# Patient Record
Sex: FEMALE | URBAN - METROPOLITAN AREA
[De-identification: names, ages, dates, MRNs, and addresses within clinical notes are randomized per-mention and may not be internally consistent; named-entity substitution may affect disease eponyms.]

---

## 2023-05-18 ENCOUNTER — HOSPITAL ENCOUNTER (OUTPATIENT)
Facility: MEDICAL CENTER | Age: 73
End: 2023-05-20
Attending: STUDENT IN AN ORGANIZED HEALTH CARE EDUCATION/TRAINING PROGRAM | Admitting: STUDENT IN AN ORGANIZED HEALTH CARE EDUCATION/TRAINING PROGRAM

## 2023-05-18 DIAGNOSIS — N30.00 ACUTE CYSTITIS WITHOUT HEMATURIA: ICD-10-CM

## 2023-05-18 DIAGNOSIS — R79.89 ELEVATED LACTIC ACID LEVEL: ICD-10-CM

## 2023-05-18 DIAGNOSIS — R10.33 PERIUMBILICAL ABDOMINAL PAIN: ICD-10-CM

## 2023-05-18 DIAGNOSIS — K80.20 CALCULUS OF GALLBLADDER WITHOUT CHOLECYSTITIS WITHOUT OBSTRUCTION: ICD-10-CM

## 2023-05-18 DIAGNOSIS — R10.13 EPIGASTRIC PAIN: ICD-10-CM

## 2023-05-18 DIAGNOSIS — R11.2 NAUSEA AND VOMITING, UNSPECIFIED VOMITING TYPE: ICD-10-CM

## 2023-05-18 LAB — EKG IMPRESSION: NORMAL

## 2023-05-18 PROCEDURE — 93005 ELECTROCARDIOGRAM TRACING: CPT | Performed by: STUDENT IN AN ORGANIZED HEALTH CARE EDUCATION/TRAINING PROGRAM

## 2023-05-18 PROCEDURE — 93005 ELECTROCARDIOGRAM TRACING: CPT

## 2023-05-18 PROCEDURE — 99285 EMERGENCY DEPT VISIT HI MDM: CPT

## 2023-05-19 ENCOUNTER — APPOINTMENT (OUTPATIENT)
Dept: RADIOLOGY | Facility: MEDICAL CENTER | Age: 73
End: 2023-05-19
Attending: NURSE PRACTITIONER

## 2023-05-19 ENCOUNTER — APPOINTMENT (OUTPATIENT)
Dept: RADIOLOGY | Facility: MEDICAL CENTER | Age: 73
End: 2023-05-19
Attending: STUDENT IN AN ORGANIZED HEALTH CARE EDUCATION/TRAINING PROGRAM

## 2023-05-19 LAB
ALBUMIN SERPL BCP-MCNC: 3.6 G/DL (ref 3.2–4.9)
ALBUMIN/GLOB SERPL: 1.3 G/DL
ALP SERPL-CCNC: 69 U/L (ref 30–99)
ALT SERPL-CCNC: 14 U/L (ref 2–50)
ANION GAP SERPL CALC-SCNC: 14 MMOL/L (ref 7–16)
APPEARANCE UR: CLEAR
APPEARANCE UR: CLEAR
AST SERPL-CCNC: 15 U/L (ref 12–45)
BACTERIA #/AREA URNS HPF: ABNORMAL /HPF
BASOPHILS # BLD AUTO: 0.4 % (ref 0–1.8)
BASOPHILS # BLD AUTO: 0.5 % (ref 0–1.8)
BASOPHILS # BLD: 0.04 K/UL (ref 0–0.12)
BASOPHILS # BLD: 0.04 K/UL (ref 0–0.12)
BILIRUB SERPL-MCNC: 0.4 MG/DL (ref 0.1–1.5)
BILIRUB UR QL STRIP.AUTO: NEGATIVE
BILIRUB UR QL STRIP.AUTO: NEGATIVE
BUN SERPL-MCNC: 21 MG/DL (ref 8–22)
CALCIUM ALBUM COR SERPL-MCNC: 9.1 MG/DL (ref 8.5–10.5)
CALCIUM SERPL-MCNC: 8.8 MG/DL (ref 8.5–10.5)
CHLORIDE SERPL-SCNC: 99 MMOL/L (ref 96–112)
CO2 SERPL-SCNC: 20 MMOL/L (ref 20–33)
COLOR UR: YELLOW
COLOR UR: YELLOW
CREAT SERPL-MCNC: 0.68 MG/DL (ref 0.5–1.4)
EOSINOPHIL # BLD AUTO: 0.02 K/UL (ref 0–0.51)
EOSINOPHIL # BLD AUTO: 0.03 K/UL (ref 0–0.51)
EOSINOPHIL NFR BLD: 0.2 % (ref 0–6.9)
EOSINOPHIL NFR BLD: 0.3 % (ref 0–6.9)
EPI CELLS #/AREA URNS HPF: NEGATIVE /HPF
ERYTHROCYTE [DISTWIDTH] IN BLOOD BY AUTOMATED COUNT: 46.5 FL (ref 35.9–50)
ERYTHROCYTE [DISTWIDTH] IN BLOOD BY AUTOMATED COUNT: 49.5 FL (ref 35.9–50)
GFR SERPLBLD CREATININE-BSD FMLA CKD-EPI: 92 ML/MIN/1.73 M 2
GLOBULIN SER CALC-MCNC: 2.8 G/DL (ref 1.9–3.5)
GLUCOSE SERPL-MCNC: 116 MG/DL (ref 65–99)
GLUCOSE UR STRIP.AUTO-MCNC: NEGATIVE MG/DL
GLUCOSE UR STRIP.AUTO-MCNC: NEGATIVE MG/DL
HCT VFR BLD AUTO: 33.9 % (ref 37–47)
HCT VFR BLD AUTO: 35.5 % (ref 37–47)
HGB BLD-MCNC: 11.1 G/DL (ref 12–16)
HGB BLD-MCNC: 12 G/DL (ref 12–16)
HYALINE CASTS #/AREA URNS LPF: ABNORMAL /LPF
IMM GRANULOCYTES # BLD AUTO: 0.03 K/UL (ref 0–0.11)
IMM GRANULOCYTES # BLD AUTO: 0.05 K/UL (ref 0–0.11)
IMM GRANULOCYTES NFR BLD AUTO: 0.3 % (ref 0–0.9)
IMM GRANULOCYTES NFR BLD AUTO: 0.6 % (ref 0–0.9)
KETONES UR STRIP.AUTO-MCNC: NEGATIVE MG/DL
KETONES UR STRIP.AUTO-MCNC: NEGATIVE MG/DL
LACTATE SERPL-SCNC: 2.3 MMOL/L (ref 0.5–2)
LACTATE SERPL-SCNC: 2.4 MMOL/L (ref 0.5–2)
LACTATE SERPL-SCNC: 2.6 MMOL/L (ref 0.5–2)
LEUKOCYTE ESTERASE UR QL STRIP.AUTO: ABNORMAL
LEUKOCYTE ESTERASE UR QL STRIP.AUTO: NEGATIVE
LIPASE SERPL-CCNC: 37 U/L (ref 11–82)
LYMPHOCYTES # BLD AUTO: 1.28 K/UL (ref 1–4.8)
LYMPHOCYTES # BLD AUTO: 1.58 K/UL (ref 1–4.8)
LYMPHOCYTES NFR BLD: 13.8 % (ref 22–41)
LYMPHOCYTES NFR BLD: 17.9 % (ref 22–41)
MCH RBC QN AUTO: 30.9 PG (ref 27–33)
MCH RBC QN AUTO: 31.8 PG (ref 27–33)
MCHC RBC AUTO-ENTMCNC: 32.7 G/DL (ref 33.6–35)
MCHC RBC AUTO-ENTMCNC: 33.8 G/DL (ref 33.6–35)
MCV RBC AUTO: 91.5 FL (ref 81.4–97.8)
MCV RBC AUTO: 97.1 FL (ref 81.4–97.8)
MICRO URNS: ABNORMAL
MICRO URNS: NORMAL
MONOCYTES # BLD AUTO: 0.52 K/UL (ref 0–0.85)
MONOCYTES # BLD AUTO: 0.54 K/UL (ref 0–0.85)
MONOCYTES NFR BLD AUTO: 5.8 % (ref 0–13.4)
MONOCYTES NFR BLD AUTO: 5.9 % (ref 0–13.4)
NEUTROPHILS # BLD AUTO: 6.63 K/UL (ref 2–7.15)
NEUTROPHILS # BLD AUTO: 7.38 K/UL (ref 2–7.15)
NEUTROPHILS NFR BLD: 74.8 % (ref 44–72)
NEUTROPHILS NFR BLD: 79.5 % (ref 44–72)
NITRITE UR QL STRIP.AUTO: NEGATIVE
NITRITE UR QL STRIP.AUTO: POSITIVE
NRBC # BLD AUTO: 0 K/UL
NRBC # BLD AUTO: 0 K/UL
NRBC BLD-RTO: 0 /100 WBC
NRBC BLD-RTO: 0 /100 WBC
PH UR STRIP.AUTO: 6.5 [PH] (ref 5–8)
PH UR STRIP.AUTO: 7.5 [PH] (ref 5–8)
PLATELET # BLD AUTO: 204 K/UL (ref 164–446)
PLATELET # BLD AUTO: 229 K/UL (ref 164–446)
PMV BLD AUTO: 10 FL (ref 9–12.9)
PMV BLD AUTO: 9.8 FL (ref 9–12.9)
POTASSIUM SERPL-SCNC: 3.9 MMOL/L (ref 3.6–5.5)
PROCALCITONIN SERPL-MCNC: <0.05 NG/ML
PROT SERPL-MCNC: 6.4 G/DL (ref 6–8.2)
PROT UR QL STRIP: NEGATIVE MG/DL
PROT UR QL STRIP: NEGATIVE MG/DL
RBC # BLD AUTO: 3.49 M/UL (ref 4.2–5.4)
RBC # BLD AUTO: 3.88 M/UL (ref 4.2–5.4)
RBC # URNS HPF: ABNORMAL /HPF
RBC UR QL AUTO: NEGATIVE
RBC UR QL AUTO: NEGATIVE
SODIUM SERPL-SCNC: 133 MMOL/L (ref 135–145)
SP GR UR STRIP.AUTO: 1.01
SP GR UR STRIP.AUTO: 1.02
TROPONIN T SERPL-MCNC: 11 NG/L (ref 6–19)
UROBILINOGEN UR STRIP.AUTO-MCNC: 0.2 MG/DL
UROBILINOGEN UR STRIP.AUTO-MCNC: 0.2 MG/DL
WBC # BLD AUTO: 8.9 K/UL (ref 4.8–10.8)
WBC # BLD AUTO: 9.3 K/UL (ref 4.8–10.8)
WBC #/AREA URNS HPF: ABNORMAL /HPF

## 2023-05-19 PROCEDURE — A9270 NON-COVERED ITEM OR SERVICE: HCPCS | Performed by: STUDENT IN AN ORGANIZED HEALTH CARE EDUCATION/TRAINING PROGRAM

## 2023-05-19 PROCEDURE — 36415 COLL VENOUS BLD VENIPUNCTURE: CPT

## 2023-05-19 PROCEDURE — 80053 COMPREHEN METABOLIC PANEL: CPT

## 2023-05-19 PROCEDURE — 96374 THER/PROPH/DIAG INJ IV PUSH: CPT

## 2023-05-19 PROCEDURE — 96375 TX/PRO/DX INJ NEW DRUG ADDON: CPT

## 2023-05-19 PROCEDURE — 81003 URINALYSIS AUTO W/O SCOPE: CPT

## 2023-05-19 PROCEDURE — 700102 HCHG RX REV CODE 250 W/ 637 OVERRIDE(OP): Performed by: STUDENT IN AN ORGANIZED HEALTH CARE EDUCATION/TRAINING PROGRAM

## 2023-05-19 PROCEDURE — 81001 URINALYSIS AUTO W/SCOPE: CPT

## 2023-05-19 PROCEDURE — A9270 NON-COVERED ITEM OR SERVICE: HCPCS

## 2023-05-19 PROCEDURE — 700111 HCHG RX REV CODE 636 W/ 250 OVERRIDE (IP): Performed by: STUDENT IN AN ORGANIZED HEALTH CARE EDUCATION/TRAINING PROGRAM

## 2023-05-19 PROCEDURE — 700102 HCHG RX REV CODE 250 W/ 637 OVERRIDE(OP)

## 2023-05-19 PROCEDURE — 700117 HCHG RX CONTRAST REV CODE 255: Performed by: STUDENT IN AN ORGANIZED HEALTH CARE EDUCATION/TRAINING PROGRAM

## 2023-05-19 PROCEDURE — G0378 HOSPITAL OBSERVATION PER HR: HCPCS

## 2023-05-19 PROCEDURE — 96376 TX/PRO/DX INJ SAME DRUG ADON: CPT

## 2023-05-19 PROCEDURE — 96372 THER/PROPH/DIAG INJ SC/IM: CPT

## 2023-05-19 PROCEDURE — 83605 ASSAY OF LACTIC ACID: CPT | Mod: 91

## 2023-05-19 PROCEDURE — 700105 HCHG RX REV CODE 258

## 2023-05-19 PROCEDURE — 700105 HCHG RX REV CODE 258: Performed by: NURSE PRACTITIONER

## 2023-05-19 PROCEDURE — 83690 ASSAY OF LIPASE: CPT

## 2023-05-19 PROCEDURE — 84145 PROCALCITONIN (PCT): CPT

## 2023-05-19 PROCEDURE — 0241U HCHG SARS-COV-2 COVID-19 NFCT DS RESP RNA 4 TRGT MIC: CPT

## 2023-05-19 PROCEDURE — 74177 CT ABD & PELVIS W/CONTRAST: CPT

## 2023-05-19 PROCEDURE — 700105 HCHG RX REV CODE 258: Performed by: STUDENT IN AN ORGANIZED HEALTH CARE EDUCATION/TRAINING PROGRAM

## 2023-05-19 PROCEDURE — 85025 COMPLETE CBC W/AUTO DIFF WBC: CPT | Mod: 91

## 2023-05-19 PROCEDURE — 84484 ASSAY OF TROPONIN QUANT: CPT

## 2023-05-19 PROCEDURE — 71045 X-RAY EXAM CHEST 1 VIEW: CPT

## 2023-05-19 PROCEDURE — 99222 1ST HOSP IP/OBS MODERATE 55: CPT | Performed by: STUDENT IN AN ORGANIZED HEALTH CARE EDUCATION/TRAINING PROGRAM

## 2023-05-19 PROCEDURE — C9803 HOPD COVID-19 SPEC COLLECT: HCPCS

## 2023-05-19 RX ORDER — SODIUM CHLORIDE, SODIUM LACTATE, POTASSIUM CHLORIDE, CALCIUM CHLORIDE 600; 310; 30; 20 MG/100ML; MG/100ML; MG/100ML; MG/100ML
INJECTION, SOLUTION INTRAVENOUS CONTINUOUS
Status: DISCONTINUED | OUTPATIENT
Start: 2023-05-19 | End: 2023-05-20

## 2023-05-19 RX ORDER — ACETAMINOPHEN 325 MG/1
650 TABLET ORAL EVERY 6 HOURS PRN
Status: DISCONTINUED | OUTPATIENT
Start: 2023-05-24 | End: 2023-05-20 | Stop reason: HOSPADM

## 2023-05-19 RX ORDER — ONDANSETRON 2 MG/ML
4 INJECTION INTRAMUSCULAR; INTRAVENOUS ONCE
Status: COMPLETED | OUTPATIENT
Start: 2023-05-19 | End: 2023-05-19

## 2023-05-19 RX ORDER — LOSARTAN POTASSIUM 50 MG/1
50 TABLET ORAL DAILY
COMMUNITY

## 2023-05-19 RX ORDER — SODIUM CHLORIDE, SODIUM LACTATE, POTASSIUM CHLORIDE, AND CALCIUM CHLORIDE .6; .31; .03; .02 G/100ML; G/100ML; G/100ML; G/100ML
500 INJECTION, SOLUTION INTRAVENOUS ONCE
Status: COMPLETED | OUTPATIENT
Start: 2023-05-19 | End: 2023-05-19

## 2023-05-19 RX ORDER — OMEPRAZOLE 20 MG/1
40 CAPSULE, DELAYED RELEASE ORAL DAILY
Status: DISCONTINUED | OUTPATIENT
Start: 2023-05-19 | End: 2023-05-20 | Stop reason: HOSPADM

## 2023-05-19 RX ORDER — OXYCODONE HYDROCHLORIDE 5 MG/1
5 TABLET ORAL
Status: DISCONTINUED | OUTPATIENT
Start: 2023-05-19 | End: 2023-05-20 | Stop reason: HOSPADM

## 2023-05-19 RX ORDER — POLYETHYLENE GLYCOL 3350 17 G/17G
1 POWDER, FOR SOLUTION ORAL
Status: DISCONTINUED | OUTPATIENT
Start: 2023-05-19 | End: 2023-05-20 | Stop reason: HOSPADM

## 2023-05-19 RX ORDER — SODIUM CHLORIDE 9 MG/ML
1000 INJECTION, SOLUTION INTRAVENOUS ONCE
Status: COMPLETED | OUTPATIENT
Start: 2023-05-19 | End: 2023-05-19

## 2023-05-19 RX ORDER — ONDANSETRON 2 MG/ML
4 INJECTION INTRAMUSCULAR; INTRAVENOUS EVERY 4 HOURS PRN
Status: DISCONTINUED | OUTPATIENT
Start: 2023-05-19 | End: 2023-05-20 | Stop reason: HOSPADM

## 2023-05-19 RX ORDER — MORPHINE SULFATE 4 MG/ML
2 INJECTION INTRAVENOUS ONCE
Status: COMPLETED | OUTPATIENT
Start: 2023-05-19 | End: 2023-05-19

## 2023-05-19 RX ORDER — SUCRALFATE ORAL 1 G/10ML
1 SUSPENSION ORAL EVERY 6 HOURS
Status: DISCONTINUED | OUTPATIENT
Start: 2023-05-19 | End: 2023-05-19

## 2023-05-19 RX ORDER — OXYCODONE HYDROCHLORIDE 10 MG/1
10 TABLET ORAL
Status: DISCONTINUED | OUTPATIENT
Start: 2023-05-19 | End: 2023-05-20 | Stop reason: HOSPADM

## 2023-05-19 RX ORDER — ENOXAPARIN SODIUM 100 MG/ML
40 INJECTION SUBCUTANEOUS DAILY
Status: DISCONTINUED | OUTPATIENT
Start: 2023-05-19 | End: 2023-05-20 | Stop reason: HOSPADM

## 2023-05-19 RX ORDER — SUCRALFATE 1 G/1
1 TABLET ORAL EVERY 6 HOURS
Status: DISCONTINUED | OUTPATIENT
Start: 2023-05-19 | End: 2023-05-19

## 2023-05-19 RX ORDER — ACETAMINOPHEN 325 MG/1
650 TABLET ORAL EVERY 6 HOURS
Status: DISCONTINUED | OUTPATIENT
Start: 2023-05-19 | End: 2023-05-20 | Stop reason: HOSPADM

## 2023-05-19 RX ORDER — AMOXICILLIN 250 MG
2 CAPSULE ORAL 2 TIMES DAILY
Status: DISCONTINUED | OUTPATIENT
Start: 2023-05-19 | End: 2023-05-20 | Stop reason: HOSPADM

## 2023-05-19 RX ORDER — SODIUM CHLORIDE, SODIUM LACTATE, POTASSIUM CHLORIDE, CALCIUM CHLORIDE 600; 310; 30; 20 MG/100ML; MG/100ML; MG/100ML; MG/100ML
INJECTION, SOLUTION INTRAVENOUS CONTINUOUS
Status: ACTIVE | OUTPATIENT
Start: 2023-05-19 | End: 2023-05-19

## 2023-05-19 RX ORDER — BISACODYL 10 MG
10 SUPPOSITORY, RECTAL RECTAL
Status: DISCONTINUED | OUTPATIENT
Start: 2023-05-19 | End: 2023-05-20 | Stop reason: HOSPADM

## 2023-05-19 RX ORDER — HYDROMORPHONE HYDROCHLORIDE 1 MG/ML
0.5 INJECTION, SOLUTION INTRAMUSCULAR; INTRAVENOUS; SUBCUTANEOUS
Status: DISCONTINUED | OUTPATIENT
Start: 2023-05-19 | End: 2023-05-20 | Stop reason: HOSPADM

## 2023-05-19 RX ADMIN — MORPHINE SULFATE 2 MG: 4 INJECTION INTRAVENOUS at 01:56

## 2023-05-19 RX ADMIN — ACETAMINOPHEN 650 MG: 325 TABLET, FILM COATED ORAL at 23:16

## 2023-05-19 RX ADMIN — ENOXAPARIN SODIUM 40 MG: 100 INJECTION SUBCUTANEOUS at 17:51

## 2023-05-19 RX ADMIN — SODIUM CHLORIDE 1000 ML: 9 INJECTION, SOLUTION INTRAVENOUS at 06:20

## 2023-05-19 RX ADMIN — SODIUM CHLORIDE, POTASSIUM CHLORIDE, SODIUM LACTATE AND CALCIUM CHLORIDE 500 ML: 600; 310; 30; 20 INJECTION, SOLUTION INTRAVENOUS at 10:05

## 2023-05-19 RX ADMIN — OMEPRAZOLE 40 MG: 20 CAPSULE, DELAYED RELEASE ORAL at 06:18

## 2023-05-19 RX ADMIN — ACETAMINOPHEN 650 MG: 325 TABLET, FILM COATED ORAL at 11:28

## 2023-05-19 RX ADMIN — OXYCODONE HYDROCHLORIDE 10 MG: 10 TABLET ORAL at 05:34

## 2023-05-19 RX ADMIN — SUCRALFATE 1 G: 1 TABLET ORAL at 10:04

## 2023-05-19 RX ADMIN — ACETAMINOPHEN 650 MG: 325 TABLET, FILM COATED ORAL at 06:18

## 2023-05-19 RX ADMIN — SODIUM CHLORIDE 1000 ML: 9 INJECTION, SOLUTION INTRAVENOUS at 01:55

## 2023-05-19 RX ADMIN — LIDOCAINE HYDROCHLORIDE 15 ML: 20 SOLUTION OROPHARYNGEAL at 03:52

## 2023-05-19 RX ADMIN — IOHEXOL 100 ML: 350 INJECTION, SOLUTION INTRAVENOUS at 02:20

## 2023-05-19 RX ADMIN — SODIUM CHLORIDE, POTASSIUM CHLORIDE, SODIUM LACTATE AND CALCIUM CHLORIDE: 600; 310; 30; 20 INJECTION, SOLUTION INTRAVENOUS at 06:22

## 2023-05-19 RX ADMIN — SODIUM CHLORIDE, POTASSIUM CHLORIDE, SODIUM LACTATE AND CALCIUM CHLORIDE: 600; 310; 30; 20 INJECTION, SOLUTION INTRAVENOUS at 17:50

## 2023-05-19 RX ADMIN — LIDOCAINE HYDROCHLORIDE 30 ML: 20 SOLUTION OROPHARYNGEAL at 06:18

## 2023-05-19 RX ADMIN — ACETAMINOPHEN 650 MG: 325 TABLET, FILM COATED ORAL at 17:49

## 2023-05-19 RX ADMIN — SUCRALFATE 1 G: 1 TABLET ORAL at 12:32

## 2023-05-19 RX ADMIN — SENNOSIDES AND DOCUSATE SODIUM 2 TABLET: 50; 8.6 TABLET ORAL at 06:18

## 2023-05-19 RX ADMIN — MORPHINE SULFATE 2 MG: 4 INJECTION INTRAVENOUS at 03:51

## 2023-05-19 RX ADMIN — FAMOTIDINE 20 MG: 10 INJECTION INTRAVENOUS at 03:52

## 2023-05-19 RX ADMIN — SODIUM CHLORIDE, POTASSIUM CHLORIDE, SODIUM LACTATE AND CALCIUM CHLORIDE: 600; 310; 30; 20 INJECTION, SOLUTION INTRAVENOUS at 20:30

## 2023-05-19 RX ADMIN — SODIUM CHLORIDE, POTASSIUM CHLORIDE, SODIUM LACTATE AND CALCIUM CHLORIDE: 600; 310; 30; 20 INJECTION, SOLUTION INTRAVENOUS at 11:21

## 2023-05-19 RX ADMIN — SENNOSIDES AND DOCUSATE SODIUM 2 TABLET: 50; 8.6 TABLET ORAL at 17:49

## 2023-05-19 RX ADMIN — ONDANSETRON 4 MG: 2 INJECTION INTRAMUSCULAR; INTRAVENOUS at 01:55

## 2023-05-19 ASSESSMENT — ENCOUNTER SYMPTOMS
HEADACHES: 0
PALPITATIONS: 0
ABDOMINAL PAIN: 1
VOMITING: 1
SHORTNESS OF BREATH: 0
DOUBLE VISION: 0
SINUS PAIN: 0
BLOOD IN STOOL: 0
WEAKNESS: 0
SORE THROAT: 0
BLURRED VISION: 0
NAUSEA: 1
COUGH: 0
CHILLS: 0
SPUTUM PRODUCTION: 0
NERVOUS/ANXIOUS: 0
DIZZINESS: 0
BACK PAIN: 0
FEVER: 0
MYALGIAS: 0

## 2023-05-19 ASSESSMENT — LIFESTYLE VARIABLES
HAVE YOU EVER FELT YOU SHOULD CUT DOWN ON YOUR DRINKING: NO
AVERAGE NUMBER OF DAYS PER WEEK YOU HAVE A DRINK CONTAINING ALCOHOL: 0
TOTAL SCORE: 0
ALCOHOL_USE: NO
HAVE PEOPLE ANNOYED YOU BY CRITICIZING YOUR DRINKING: NO
ON A TYPICAL DAY WHEN YOU DRINK ALCOHOL HOW MANY DRINKS DO YOU HAVE: 0
HOW MANY TIMES IN THE PAST YEAR HAVE YOU HAD 5 OR MORE DRINKS IN A DAY: 0
TOTAL SCORE: 0
DOES PATIENT WANT TO STOP DRINKING: CANNOT ASSESS
EVER HAD A DRINK FIRST THING IN THE MORNING TO STEADY YOUR NERVES TO GET RID OF A HANGOVER: NO
EVER FELT BAD OR GUILTY ABOUT YOUR DRINKING: NO
TOTAL SCORE: 0
CONSUMPTION TOTAL: NEGATIVE

## 2023-05-19 ASSESSMENT — PATIENT HEALTH QUESTIONNAIRE - PHQ9
SUM OF ALL RESPONSES TO PHQ9 QUESTIONS 1 AND 2: 0
2. FEELING DOWN, DEPRESSED, IRRITABLE, OR HOPELESS: NOT AT ALL
1. LITTLE INTEREST OR PLEASURE IN DOING THINGS: NOT AT ALL

## 2023-05-19 ASSESSMENT — FIBROSIS 4 INDEX: FIB4 SCORE: 1.26

## 2023-05-19 ASSESSMENT — PAIN DESCRIPTION - PAIN TYPE
TYPE: ACUTE PAIN

## 2023-05-19 NOTE — ASSESSMENT & PLAN NOTE
Noted on CT  She describes episodes of pain suspicious for biliary colic in past, however she states this episode is entirely different.   No dilation or secondary signs of inflammation reported on CT.

## 2023-05-19 NOTE — HOSPITAL COURSE
"Professional  services were used in the patient's primary language of Bengali.    Umu Harris is a 72 y.o. female with history of cholelithiasis and GERD on no home medications who presented to ED on 5/18 with abdominal pain.  After eating spicy food for dinner she developed a midline to LUQ pain around 6pm in the evening. She describes the pain as constant with intermittent increases, burning, and cramping. She has not eaten since the pain began and noted no exacerbating or alleviating factors. She did initially have some associated nausea and vomiting. Having no change in BM pattern. No fever, chills, sick contacts, or recent travel. No dysuria. No prior abdominal surgeries. She states she has had \"gallbladder pain\" in the past which was quite unlike her pain now.     In ED, she was afebrile, HR 75, /101, on room air. Labs on presentation notable for WBC 8.0 and PMN 74.8%, Na 133, glucose 116, and lactic acid 2.6. AST, ALT, ALP, Tbili, and lipase all WNL. Troponin 11. EKG sinus without ischemic changes. UA unremarkable. CT of A/P with contrast reported cholelithiasis, hepatomegaly, small fat containing umbilical hernia, and coronary artery disease, otherwise no acute findings - no secondary signs of acute cholecystitis. No focal RUQ tenderness on exam. She was treated with GI cocktail and had intermittent relief, but pain did eventually return. IVF were given and lactic acid did trend down but remained persistently elevated. Patient was admitted to hospitalist service for observation, continued fluids, and serial lactates.     She was hydrated with IVF overnight. Due to symptoms of dysuria, her UA was repeated and revealed a urinary infection. Ceftriaxone was started after which her hyperlactatemia resolved. She remained afebrile. By 5/20 AM she was able to tolerate a diet without nausea / pain. She feels ready to go home at this time. Prescriptions were provided for 3 days Bactrim DS " BID as well as continuation of oral omeprazole at home. Provided instructions to follow with her PCP and sent referral for a [facility] PCP in case she wishes to establish with one.

## 2023-05-19 NOTE — ED PROVIDER NOTES
ED Provider Note    CHIEF COMPLAINT  Chief Complaint   Patient presents with    Abdominal Pain     Patient reports to generalized abdominal pain that started at 1800 this evening. Patient reports the pain is located more severely above her umbilicus but is generalized through out.     N/V     Started when the pain began at 1800 this evening.        EXTERNAL RECORDS REVIEWED  None    HPI/ROS  LIMITATION TO HISTORY   Select: Language Faroese,  Used   OUTSIDE HISTORIAN(S):  Significant other  at bedside    Umu Harris is a 72 y.o. female who presents with abdominal pain.  She says it started at 6 PM this evening.  She describes it as mostly periumbilical which radiates around her right side into her back.  She says it is 'coming and going' in severity and she describes it as a cramping sensation.  She says it started after eating some hot [employer].  She has associated nausea and vomiting.  She did have a normal bowel movement today without any diarrhea.  She denies any fevers or chills.  She denies any dysuria, hematuria, urgency or flank pain.  She denies any prior abdominal surgical history.    PAST MEDICAL HISTORY   has a past medical history of Hypertension.    SURGICAL HISTORY  patient denies any surgical history    FAMILY HISTORY  History reviewed. No pertinent family history.    SOCIAL HISTORY  Social History     Tobacco Use    Smoking status: Never    Smokeless tobacco: Never   Substance and Sexual Activity    Alcohol use: Not Currently    Drug use: Never    Sexual activity: Not on file       CURRENT MEDICATIONS  Home Medications       Reviewed by Osman Cueto R.N. (Registered Nurse) on 05/18/23 at 2336  Med List Status: Partial     Medication Last Dose Status        Patient Manjit Taking any Medications                           ALLERGIES  No Known Allergies    PHYSICAL EXAM  VITAL SIGNS: BP (!) 178/81   Pulse 63   Temp 36.2 °C (97.2 °F) (Temporal)   Resp 17   Ht 1.575 m  "(5' 2\")   Wt 72.6 kg (160 lb)   SpO2 94%   BMI 29.26 kg/m²    Constitutional: Awake and alert . Non toxic  HENT: Normal inspection. Dry mucous membranes  Eyes: Normal inspection  Neck: Grossly normal range of motion.  Cardiovascular: Normal heart rate, Normal rhythm.  Symmetric peripheral pulses.   Thorax & Lungs: No respiratory distress, No wheezing, No rales, No rhonchi, No chest tenderness.   Abdomen: Soft, non-distended, she has periumbilical pain without tenderness to palpation in the right lower quadrant, left lower quadrant or right upper quadrant.  Negative Rodney sign., no mass  Skin: No obvious rash.  Extremities: Warm, well perfused. No clubbing, cyanosis, edema   Neurologic: Grossly normal   Psychiatric: Normal for situation      DIAGNOSTIC STUDIES / PROCEDURES  EKG  I have independently interpreted this EKG  Normal rate, normal rhythm.  Intervals within normal limits.  Poor R wave progression.  No ST wave elevations or depressions.    LABS  Results for orders placed or performed during the hospital encounter of 05/18/23   CBC with Differential   Result Value Ref Range    WBC 8.9 4.8 - 10.8 K/uL    RBC 3.88 (L) 4.20 - 5.40 M/uL    Hemoglobin 12.0 12.0 - 16.0 g/dL    Hematocrit 35.5 (L) 37.0 - 47.0 %    MCV 91.5 81.4 - 97.8 fL    MCH 30.9 27.0 - 33.0 pg    MCHC 33.8 33.6 - 35.0 g/dL    RDW 46.5 35.9 - 50.0 fL    Platelet Count 229 164 - 446 K/uL    MPV 10.0 9.0 - 12.9 fL    Neutrophils-Polys 74.80 (H) 44.00 - 72.00 %    Lymphocytes 17.90 (L) 22.00 - 41.00 %    Monocytes 5.90 0.00 - 13.40 %    Eosinophils 0.30 0.00 - 6.90 %    Basophils 0.50 0.00 - 1.80 %    Immature Granulocytes 0.60 0.00 - 0.90 %    Nucleated RBC 0.00 /100 WBC    Neutrophils (Absolute) 6.63 2.00 - 7.15 K/uL    Lymphs (Absolute) 1.58 1.00 - 4.80 K/uL    Monos (Absolute) 0.52 0.00 - 0.85 K/uL    Eos (Absolute) 0.03 0.00 - 0.51 K/uL    Baso (Absolute) 0.04 0.00 - 0.12 K/uL    Immature Granulocytes (abs) 0.05 0.00 - 0.11 K/uL    NRBC " (Absolute) 0.00 K/uL   Complete Metabolic Panel   Result Value Ref Range    Sodium 133 (L) 135 - 145 mmol/L    Potassium 3.9 3.6 - 5.5 mmol/L    Chloride 99 96 - 112 mmol/L    Co2 20 20 - 33 mmol/L    Anion Gap 14.0 7.0 - 16.0    Glucose 116 (H) 65 - 99 mg/dL    Bun 21 8 - 22 mg/dL    Creatinine 0.68 0.50 - 1.40 mg/dL    Calcium 8.8 8.5 - 10.5 mg/dL    AST(SGOT) 15 12 - 45 U/L    ALT(SGPT) 14 2 - 50 U/L    Alkaline Phosphatase 69 30 - 99 U/L    Total Bilirubin 0.4 0.1 - 1.5 mg/dL    Albumin 3.6 3.2 - 4.9 g/dL    Total Protein 6.4 6.0 - 8.2 g/dL    Globulin 2.8 1.9 - 3.5 g/dL    A-G Ratio 1.3 g/dL   Lipase   Result Value Ref Range    Lipase 37 11 - 82 U/L   Urinalysis    Specimen: Urine   Result Value Ref Range    Color Yellow     Character Clear     Specific Gravity 1.010 <1.035    Ph 7.5 5.0 - 8.0    Glucose Negative Negative mg/dL    Ketones Negative Negative mg/dL    Protein Negative Negative mg/dL    Bilirubin Negative Negative    Urobilinogen, Urine 0.2 Negative    Nitrite Negative Negative    Leukocyte Esterase Negative Negative    Occult Blood Negative Negative    Micro Urine Req see below    LACTIC ACID   Result Value Ref Range    Lactic Acid 2.6 (H) 0.5 - 2.0 mmol/L   TROPONIN   Result Value Ref Range    Troponin T 11 6 - 19 ng/L   ESTIMATED GFR   Result Value Ref Range    GFR (CKD-EPI) 92 >60 mL/min/1.73 m 2   CORRECTED CALCIUM   Result Value Ref Range    Correct Calcium 9.1 8.5 - 10.5 mg/dL   LACTIC ACID   Result Value Ref Range    Lactic Acid 2.3 (H) 0.5 - 2.0 mmol/L   EKG (NOW)   Result Value Ref Range    Report       [facility] Emergency Dept.    Test Date:  2023  Pt Name:    FIDENCIO LIRIANO      Department: ER  MRN:        [id number]                      Room:  Gender:     Female                       Technician: [id number]  :        [id number]                   Requested By:ER TRIAGE PROTOCOL  Order #:    [id number]                    Reading MD:    Measurements  Intervals                                 Axis  Rate:       67                           P:          7  UT:         149                          QRS:        -39  QRSD:       94                           T:          25  QT:         433  QTc:        457    Interpretive Statements  Sinus rhythm  Left axis deviation  Abnormal R-wave progression, early transition  No previous ECG available for comparison           RADIOLOGY  I have independently interpreted the diagnostic imaging associated with this visit and am waiting the final reading from the radiologist.   My preliminary interpretation is as follows: No obvious bowel obstruction  Radiologist interpretation:   CT-ABDOMEN-PELVIS WITH   Final Result         1.  Cholelithiasis   2.  Hepatomegaly   3.  Small fat-containing umbilical hernia   4.  Atherosclerosis and atherosclerotic coronary artery disease            COURSE & MEDICAL DECISION MAKING    ED Observation Status? Yes; I am placing the patient in to an observation status due to a diagnostic uncertainty as well as therapeutic intensity. Patient placed in observation status at 12:32 AM, 5/19/2023.     Observation plan is as follows: IV, Labs, CT, Serial Exams    Upon Reevaluation, the patient's condition has: not improved; and will be escalated to hospitalization.    Patient discharged from ED Observation status at 5.00 AM 5/19    INITIAL ASSESSMENT, COURSE AND PLAN  Care Narrative: Patient is a 72 y.o. female who presents to the Emergency Department with periumbilical abdominal pain.     Differential diagnoses include, but not limited to: gastroenteritis; appendicitis; infectious colitis; small or large bowel obstruction; diverticulitis; GERD; gastritis; PUD; mass; pancreatitis; cystitis; kidney pathology (i.e. nephrolithiasis, pyelonephritis); biliary pathology (i.e. cholecystitis, cholangitis); ovarian torsion; ovarian cysts; ACS; mesenteric ischemia.     Patient arrived stable with normal vital signs.  Overall well  appearing, but in mild discomfort. ECG notable for no signs of ischemic. Labs notable for negative troponin, normal lipase, lactate elevated at 2.6.  Urinalysis without evidence of infection. ACS unlikely given reassuring ECG, negative troponin, and Heart Score of 3.  Mesenteric ischemia unlikely given reassuring exam, lactate elevated but is down trending after fluids. Ovarian pathology unlikely without complaints of pelvic pain .  CTAP notable for cholelithiasis, hepatomegaly and a fat-containing hernia but otherwise no acute findings..  CT without secondary signs of acute cholecystitis and I do think that this is unlikely given no focal right upper quadrant tenderness, no leukocytosis and normal liver function testing.    Patient given IVF, Zofran, and morphine    Patient was reevaluated and again had a worsening of her symptoms.  She continues to have a benign exam.  I treated her with a GI cocktail additional dose of morphine and famotidine.      Patient was reevaluated again, she is still endorsing pain.  She does intermittently get some relief however then comes back again.  Her lactate is down trending but remains persistently elevated and it is possible she remains dehydrated.  I again doubt ischemic process and her exam remains benign.  However given her persistent symptoms think it is safest to admit her to the hospital for observation, continued IV fluids and serial lactate      HYDRATION: Based on the patient's presentation of Acute Vomiting the patient was given IV fluids. IV Hydration was used because oral hydration was not adequate alone. Upon recheck following hydration, the patient was improved.        DISPOSITION AND DISCUSSIONS  I have discussed management of the patient with the following physicians and MICAELA's:  Dr. Vargas    Discussion of management with other [facility] or appropriate source(s): None       FINAL DIAGNOSIS  1. Nausea and vomiting, unspecified vomiting type Acute   2. Periumbilical  abdominal pain Acute   3. Elevated lactic acid level Acute          Electronically signed by: Ana Mendez M.D., 5/19/2023 12:29 AM

## 2023-05-19 NOTE — PROGRESS NOTES
"Hospital Medicine Daily Progress Note    Date of Service  5/19/2023    Chief Complaint  Umu Harris is a 72 y.o. female admitted 5/18/2023 with abdominal pain    Hospital Course  Professional  services were used in the patient's primary language of Armenian.    Umu Harris is a 72 y.o. female with history of cholelithiasis and GERD on no home medications who presented to ED on 5/18 with abdominal pain.  After eating spicy food for dinner she developed a midline to LUQ pain around 6pm in the evening. She describes the pain as constant with intermittent increases, burning, and cramping. She has not eaten since the pain began and noted no exacerbating or alleviating factors. She did initially have some associated nausea and vomiting. Having no change in BM pattern. No fever, chills, sick contacts, or recent travel. No dysuria. No prior abdominal surgeries. She states she has had \"gallbladder pain\" in the past which was quite unlike her pain now.     In ED, she was afebrile, HR 75, /101, on room air. Labs on presentation notable for WBC 8.0 and PMN 74.8%, Na 133, glucose 116, and lactic acid 2.6. AST, ALT, ALP, Tbili, and lipase all WNL. Troponin 11. EKG sinus without ischemic changes. UA unremarkable. CT of A/P with contrast reported cholelithiasis, hepatomegaly, small fat containing umbilical hernia, and coronary artery disease, otherwise no acute findings - no secondary signs of acute cholecystitis. No focal RUQ tenderness on exam. She was treated with GI cocktail and had intermittent relief, but pain did eventually return. IVF were given and lactic acid did trend down but remained persistently elevated. Patient was admitted to hospitalist service for observation, continued fluids, and serial lactates.     Interval Problem Update  5/19/2023: Patient states she is feeling better and pain has resolved, however nursing notes she has not tolerated more than clear liquids without " nausea. Her lactic acid remains unchanged at 2.4, and has a slight increase in WBC and PMNs. Afebrile, HR 60s, normotensive, room air.  No overt process on CXR or CT abdomen pelvis, cholelithiasis noted but biliary tree reported as non-dilated. No abdominal tenderness to palpation on exam, negative Rodney sign. Procal <0.05.   - H. Pylori pending. This is a send-out and result may take significant amount of time.   - Continue ppi.  - Continue IV fluids and trend lactate.     I have discussed this patient's plan of care and discharge plan at IDT rounds today with Case Management, Nursing, Nursing leadership, and other members of the IDT team.    Consultants/Specialty  none    Code Status  Full Code    Disposition  The patient is not medically cleared for discharge to home or a post-acute facility.  Anticipate discharge to: home with close outpatient follow-up    I have placed the appropriate orders for post-discharge needs.    Review of Systems  Review of Systems   Constitutional:  Negative for chills, fever and malaise/fatigue.   HENT:  Negative for congestion, sinus pain and sore throat.    Respiratory:  Negative for cough, sputum production and shortness of breath.    Cardiovascular:  Negative for chest pain and palpitations.   Gastrointestinal:  Positive for abdominal pain and nausea.   Genitourinary:  Negative for dysuria and frequency.   Musculoskeletal:  Negative for back pain and myalgias.   Neurological:  Negative for weakness and headaches.        Physical Exam  Temp:  [36.2 °C (97.2 °F)-37.2 °C (99 °F)] 37.2 °C (99 °F)  Pulse:  [63-75] 66  Resp:  [16-20] 16  BP: (121-212)/() 151/70  SpO2:  [90 %-97 %] 90 %    Physical Exam  Vitals and nursing note reviewed.   Constitutional:       General: She is not in acute distress.     Appearance: Normal appearance. She is obese. She is not ill-appearing.   HENT:      Head: Normocephalic and atraumatic.      Mouth/Throat:      Mouth: Mucous membranes are dry.       Pharynx: Oropharynx is clear. No oropharyngeal exudate or posterior oropharyngeal erythema.   Eyes:      Pupils: Pupils are equal, round, and reactive to light.   Cardiovascular:      Rate and Rhythm: Normal rate and regular rhythm.      Pulses: Normal pulses.      Heart sounds: Normal heart sounds.   Pulmonary:      Effort: Pulmonary effort is normal. No respiratory distress.      Breath sounds: Normal breath sounds. No wheezing or rales.   Abdominal:      General: Bowel sounds are normal. There is no distension.      Palpations: Abdomen is soft. There is no mass.      Tenderness: There is no abdominal tenderness. There is no right CVA tenderness, left CVA tenderness, guarding or rebound.      Comments: Non-tender to palpation, negative Rodney's sign.   Musculoskeletal:         General: No swelling. Normal range of motion.      Cervical back: Normal range of motion and neck supple.      Right lower leg: No edema.      Left lower leg: No edema.   Skin:     General: Skin is warm and dry.      Capillary Refill: Capillary refill takes less than 2 seconds.      Findings: No erythema.   Neurological:      General: No focal deficit present.      Mental Status: She is alert and oriented to person, place, and time. Mental status is at baseline.   Psychiatric:         Mood and Affect: Mood normal.         Behavior: Behavior normal.         Thought Content: Thought content normal.         Judgment: Judgment normal.         Fluids    Intake/Output Summary (Last 24 hours) at 5/19/2023 1720  Last data filed at 5/19/2023 0622  Gross per 24 hour   Intake 1060 ml   Output --   Net 1060 ml       Laboratory  Recent Labs     05/19/23  0125 05/19/23  1039   WBC 8.9 9.3   RBC 3.88* 3.49*   HEMOGLOBIN 12.0 11.1*   HEMATOCRIT 35.5* 33.9*   MCV 91.5 97.1   MCH 30.9 31.8   MCHC 33.8 32.7*   RDW 46.5 49.5   PLATELETCT 229 204   MPV 10.0 9.8     Recent Labs     05/19/23  0125   SODIUM 133*   POTASSIUM 3.9   CHLORIDE 99   CO2 20   GLUCOSE  116*   BUN 21   CREATININE 0.68   CALCIUM 8.8     Imaging  DX-CHEST-PORTABLE (1 VIEW)   Final Result      1.  Cardiomegaly. No overt failure or pneumonia.      CT-ABDOMEN-PELVIS WITH   Final Result         1.  Cholelithiasis   2.  Hepatomegaly   3.  Small fat-containing umbilical hernia   4.  Atherosclerosis and atherosclerotic coronary artery disease           Assessment/Plan  * Abdominal pain- (present on admission)  Assessment & Plan  Epigastric to left upper quadrant abdominal pain for approximately 1 day. No clear relation to food.   Pain has improved overnight and tolerating clear liquid diet, however did get nauseous with solids.   Did improve somewhat with GI cocktail and PPI.   Peptic ulcer disease (h.pylori?) vs gastritis vs other  CT abdomen pelvis with contrast revealed no acute pathologies.  She does have cholelithiasis on above CT but this pain is not typical to her biliary colic symptoms in the past.   -Continue PPI, GI cocktail  -H. pylori stool antigen pending  -Pain control, antiemetics    Cholelithiasis  Assessment & Plan  Noted on CT  She describes episodes of pain suspicious for biliary colic in past, however she states this episode is entirely different.   No dilation or secondary signs of inflammation reported on CT.     Hyperlactatemia  Assessment & Plan  Mild persistent lactic acid elevation, around 2.4  Etiology unclear. WBC and PMN juan slightly. Procalcitonin <0.05. CXR clear. CT abdomen pelvis with contrast reported no acute process. Afebrile. Her only symptom currently is nausea with solid food intake. She did appear dehydrated on initial presentation, however she has had fluid resuscitation overnight with no change in lactate.   - Continue IV fluid resuscitation  - Trend lactate         VTE prophylaxis: enoxaparin ppx    I have performed a physical exam and reviewed and updated ROS and Plan today (5/19/2023). In review of yesterday's note (5/18/2023), there are no changes except as  documented above.

## 2023-05-19 NOTE — CARE PLAN
The patient is Stable - Low risk of patient condition declining or worsening    Shift Goals  Clinical Goals: IVF, pain/N/V control  Patient Goals: pain relief  Family Goals: HARSH    Progress made toward(s) clinical / shift goals:      Problem: Pain - Standard  Goal: Alleviation of pain or a reduction in pain to the patient’s comfort goal  Outcome: Progressing     Problem: Knowledge Deficit - Standard  Goal: Patient and family/care givers will demonstrate understanding of plan of care, disease process/condition, diagnostic tests and medications  Outcome: Progressing       Patient is not progressing towards the following goals:

## 2023-05-19 NOTE — H&P
Hospital Medicine History & Physical Note    Date of Service  5/19/2023    Primary Care Physician  No primary care provider on file.      Code Status  Full Code    Chief Complaint  Chief Complaint   Patient presents with    Abdominal Pain     Patient reports to generalized abdominal pain that started at 1800 this evening. Patient reports the pain is located more severely above her umbilicus but is generalized through out.     N/V     Started when the pain began at 1800 this evening.        History of Presenting Illness  Umu Harris is a 72 y.o. female who presented 5/18/2023 with abdominal pain.     services were used in the patient's primary language of Cymraes.     Name or Number: [id number] Blaire  Mode of interpretation: iPad    Is a pleasant woman with history of cholelithiasis, currently not on any other medications or aware of other diagnosed medical problems.  She presents due to abdominal pain onset yesterday around 6 PM.  Pain is epigastric to left upper quadrant, described as constant, burning/sharp with intermittent increases in severity, associated with nausea and vomiting.  She has not noticed any relation with food as she has not tried to eat since the onset.  In the past she has had what sounds like she is describing biliary colic, with right upper quadrant pain radiating to her back and was told she had stones that needed to be removed surgically.  She reports that the pain she is experiencing now is distinctly different from that both in quality and and location.  She denies taking any NSAIDs.  No recent travel though she is from [place] originally.  She was requiring multiple doses of IV pain medications in the ED so observation admission was requested.      I discussed the plan of care with patient and family.    Review of Systems  Review of Systems   Constitutional:  Negative for chills and fever.   HENT:  Negative for sinus pain and sore throat.    Eyes:   Negative for blurred vision and double vision.   Respiratory:  Negative for cough and shortness of breath.    Cardiovascular:  Negative for chest pain and leg swelling.   Gastrointestinal:  Positive for abdominal pain, nausea and vomiting. Negative for blood in stool and melena.   Genitourinary:  Negative for dysuria and urgency.   Musculoskeletal:  Negative for joint pain and myalgias.   Neurological:  Negative for dizziness and headaches.   Psychiatric/Behavioral:  The patient is not nervous/anxious.        Past Medical History   has a past medical history of Hypertension.    Surgical History   has no past surgical history on file.     Family History  Patient patient unsure if history of gastric ulcers  Family history reviewed with patient. There is no family history that is pertinent to the chief complaint.     Social History   reports that she has never smoked. She has never used smokeless tobacco. She reports that she does not currently use alcohol. She reports that she does not use drugs.    Allergies  No Known Allergies    Medications  None       Physical Exam  Temp:  [36.2 °C (97.2 °F)-36.6 °C (97.8 °F)] 36.6 °C (97.8 °F)  Pulse:  [63-75] 64  Resp:  [17-20] 20  BP: (121-212)/() 141/63  SpO2:  [93 %-97 %] 94 %  Blood Pressure : (!) 141/63   Temperature: 36.6 °C (97.8 °F)   Pulse: 64   Respiration: 20   Pulse Oximetry: 94 %       Physical Exam  Constitutional:       Appearance: She is obese. She is not diaphoretic.      Comments: Appears uncomfortable   HENT:      Head: Normocephalic and atraumatic.      Nose: Nose normal.      Mouth/Throat:      Mouth: Mucous membranes are dry.      Pharynx: Oropharynx is clear.   Eyes:      Extraocular Movements: Extraocular movements intact.      Conjunctiva/sclera: Conjunctivae normal.   Cardiovascular:      Rate and Rhythm: Normal rate and regular rhythm.      Pulses: Normal pulses.      Heart sounds: Normal heart sounds.   Pulmonary:      Effort: Pulmonary effort  is normal.      Breath sounds: Normal breath sounds.   Abdominal:      Comments: Mild epigastric tenderness, no right upper quadrant tenderness and negative Rodney sign   Musculoskeletal:         General: No swelling or tenderness. Normal range of motion.      Cervical back: Normal range of motion and neck supple.   Skin:     General: Skin is warm and dry.      Capillary Refill: Capillary refill takes less than 2 seconds.   Neurological:      General: No focal deficit present.      Mental Status: She is oriented to person, place, and time.         Laboratory:  Recent Labs     05/19/23  0125   WBC 8.9   RBC 3.88*   HEMOGLOBIN 12.0   HEMATOCRIT 35.5*   MCV 91.5   MCH 30.9   MCHC 33.8   RDW 46.5   PLATELETCT 229   MPV 10.0     Recent Labs     05/19/23  0125   SODIUM 133*   POTASSIUM 3.9   CHLORIDE 99   CO2 20   GLUCOSE 116*   BUN 21   CREATININE 0.68   CALCIUM 8.8     Recent Labs     05/19/23  0125   ALTSGPT 14   ASTSGOT 15   ALKPHOSPHAT 69   TBILIRUBIN 0.4   LIPASE 37   GLUCOSE 116*         No results for input(s): NTPROBNP in the last 72 hours.      Recent Labs     05/19/23  0125   TROPONINT 11       Imaging:  CT-ABDOMEN-PELVIS WITH   Final Result         1.  Cholelithiasis   2.  Hepatomegaly   3.  Small fat-containing umbilical hernia   4.  Atherosclerosis and atherosclerotic coronary artery disease          EKG:  I have personally reviewed the images and compared with prior images. and My impression is: Normal sinus rhythm with ventricular rate 67, left axis deviation, no ST elevations or depressions, QTc 457    Assessment/Plan:  Justification for Admission Status  I anticipate this patient is appropriate for observation status at this time because requiring multiple doses of IV pain medications with monitoring for toxicity, trial of PPI and GI cocktail and assess efficacy    Patient will need a Med/Surg bed on MEDICAL service .  The need is secondary to above.    * Abdominal pain- (present on  admission)  Assessment & Plan  Epigastric to left upper quadrant abdominal pain for the past 12+ hours.  Constant however intermittently worsens.  No clear relation to food.  Suspicious for peptic ulcer disease, possibly H. pylori given patient is from endemic area, possible gastritis.  No evidence of pancreatitis or ACS, only mildly elevated lactic that is trending down.  Patient does have cholelithiasis and describes what is likely biliary colic in the past however is sure that this pain is different in both quality and location so doubt this is atypical biliary colic.  -Start PPI  -Trial GI cocktail  -H. pylori stool antigen  -Pain control, antiemetics  Patient requiring multiple doses of IV pain medications with monitoring for toxicity    Cholelithiasis  Assessment & Plan  Sounds like patient has had episodes of biliary colic in the past however this is described as distinctly different.  Discussed with her that if she has recurrence of biliary colic she may need elective cholecystectomy this is not the issue we are addressing at this time.    Lactic acidosis  Assessment & Plan  Improving  Continue IV fluid resuscitation        VTE prophylaxis: enoxaparin ppx

## 2023-05-19 NOTE — ASSESSMENT & PLAN NOTE
Epigastric to left upper quadrant abdominal pain for approximately 1 day. No clear relation to food.   Pain has improved overnight and tolerating clear liquid diet, however did get nauseous with solids.   Did improve somewhat with GI cocktail and PPI.   Peptic ulcer disease (h.pylori?) vs gastritis vs other  CT abdomen pelvis with contrast revealed no acute pathologies.  She does have cholelithiasis on above CT but this pain is not typical to her biliary colic symptoms in the past.   -Continue PPI, GI cocktail  -H. pylori stool antigen pending  -Pain control, antiemetics

## 2023-05-19 NOTE — ASSESSMENT & PLAN NOTE
Mild persistent lactic acid elevation, around 2.4  Etiology unclear. WBC and PMN juan slightly. Procalcitonin <0.05. CXR clear. CT abdomen pelvis with contrast reported no acute process. Afebrile. Her only symptom currently is nausea with solid food intake. She did appear dehydrated on initial presentation, however she has had fluid resuscitation overnight with no change in lactate.   - Continue IV fluid resuscitation  - Trend lactate

## 2023-05-19 NOTE — ED NOTES
PT ambulatory to room with steady gait. Changed into gown and placed on monitor. Call light within reach. Chart up for ERP.

## 2023-05-19 NOTE — PROGRESS NOTES
Med rec complete per pt and [facility] pharmacy. [facility]  Pharmacist notified.

## 2023-05-19 NOTE — PROGRESS NOTES
Assessment completed. Pt A&Ox4. Portuguese speaking, able to communicate with this RN. Respirations are even and unlabored on RA. Pt denies pain at this time. Denies N/V. Monitors applied, VS stable, call light and belongings within reach. POC updated (IVF, advance diet). Pt educated on room and call light, pt verbalized understanding. Communication board updated. Needs met.

## 2023-05-19 NOTE — PROGRESS NOTES
4 Eyes Skin Assessment Completed by LATA Badillo and LATA Keys.    Head WDL  Ears WDL  Nose WDL  Mouth WDL  Neck WDL  Breast/Chest WDL  Shoulder Blades WDL  Spine WDL  (R) Arm/Elbow/Hand WDL  (L) Arm/Elbow/Hand WDL  Abdomen WDL  Groin WDL  Scrotum/Coccyx/Buttocks WDL  (R) Leg WDL  (L) Leg WDL  (R) Heel/Foot/Toe WDL  (L) Heel/Foot/Toe WDL          Devices In Places Blood Pressure Cuff and Pulse Ox      Interventions In Place Pressure Redistribution Mattress    Possible Skin Injury No    Pictures Uploaded Into Epic N/A  Wound Consult Placed N/A  RN Wound Prevention Protocol Ordered No

## 2023-05-19 NOTE — ED TRIAGE NOTES
"Umu Harris  72 y.o.    Chief Complaint   Patient presents with    Abdominal Pain     Patient reports to generalized abdominal pain that started at 1800 this evening. Patient reports the pain is located more severely above her umbilicus but is generalized through out.     N/V     Started when the pain began at 1800 this evening.        Patient is also reporting the pain is radiating into her mid to upper back. R arm /97, L arm /101    BP (!) 206/101 Comment: Left  Pulse 75   Temp 36.2 °C (97.2 °F) (Temporal)   Resp 18   Ht 1.575 m (5' 2\")   Wt 72.6 kg (160 lb)   SpO2 97%   BMI 29.26 kg/m²     Protocol placed, pt placed in lobby and educated to alert staff with any changes or concerns.   "

## 2023-05-20 VITALS
SYSTOLIC BLOOD PRESSURE: 123 MMHG | RESPIRATION RATE: 18 BRPM | BODY MASS INDEX: 32.3 KG/M2 | WEIGHT: 182.32 LBS | DIASTOLIC BLOOD PRESSURE: 58 MMHG | HEIGHT: 63 IN | OXYGEN SATURATION: 94 % | TEMPERATURE: 99 F | HEART RATE: 71 BPM

## 2023-05-20 LAB
ALBUMIN SERPL BCP-MCNC: 3.1 G/DL (ref 3.2–4.9)
ALBUMIN/GLOB SERPL: 1.3 G/DL
ALP SERPL-CCNC: 67 U/L (ref 30–99)
ALT SERPL-CCNC: 16 U/L (ref 2–50)
ANION GAP SERPL CALC-SCNC: 12 MMOL/L (ref 7–16)
AST SERPL-CCNC: 18 U/L (ref 12–45)
BASOPHILS # BLD AUTO: 0.3 % (ref 0–1.8)
BASOPHILS # BLD: 0.02 K/UL (ref 0–0.12)
BILIRUB SERPL-MCNC: 0.8 MG/DL (ref 0.1–1.5)
BUN SERPL-MCNC: 15 MG/DL (ref 8–22)
CALCIUM ALBUM COR SERPL-MCNC: 8.7 MG/DL (ref 8.5–10.5)
CALCIUM SERPL-MCNC: 8 MG/DL (ref 8.5–10.5)
CHLORIDE SERPL-SCNC: 102 MMOL/L (ref 96–112)
CO2 SERPL-SCNC: 22 MMOL/L (ref 20–33)
CREAT SERPL-MCNC: 0.64 MG/DL (ref 0.5–1.4)
EOSINOPHIL # BLD AUTO: 0.09 K/UL (ref 0–0.51)
EOSINOPHIL NFR BLD: 1.2 % (ref 0–6.9)
ERYTHROCYTE [DISTWIDTH] IN BLOOD BY AUTOMATED COUNT: 45.6 FL (ref 35.9–50)
FLUAV RNA SPEC QL NAA+PROBE: NEGATIVE
FLUBV RNA SPEC QL NAA+PROBE: NEGATIVE
GFR SERPLBLD CREATININE-BSD FMLA CKD-EPI: 94 ML/MIN/1.73 M 2
GLOBULIN SER CALC-MCNC: 2.4 G/DL (ref 1.9–3.5)
GLUCOSE SERPL-MCNC: 109 MG/DL (ref 65–99)
HCT VFR BLD AUTO: 32 % (ref 37–47)
HGB BLD-MCNC: 11 G/DL (ref 12–16)
IMM GRANULOCYTES # BLD AUTO: 0.02 K/UL (ref 0–0.11)
IMM GRANULOCYTES NFR BLD AUTO: 0.3 % (ref 0–0.9)
LACTATE SERPL-SCNC: 1.2 MMOL/L (ref 0.5–2)
LACTATE SERPL-SCNC: 1.8 MMOL/L (ref 0.5–2)
LYMPHOCYTES # BLD AUTO: 1.43 K/UL (ref 1–4.8)
LYMPHOCYTES NFR BLD: 19.6 % (ref 22–41)
MCH RBC QN AUTO: 31.1 PG (ref 27–33)
MCHC RBC AUTO-ENTMCNC: 34.4 G/DL (ref 33.6–35)
MCV RBC AUTO: 90.4 FL (ref 81.4–97.8)
MONOCYTES # BLD AUTO: 0.53 K/UL (ref 0–0.85)
MONOCYTES NFR BLD AUTO: 7.3 % (ref 0–13.4)
NEUTROPHILS # BLD AUTO: 5.19 K/UL (ref 2–7.15)
NEUTROPHILS NFR BLD: 71.3 % (ref 44–72)
NRBC # BLD AUTO: 0 K/UL
NRBC BLD-RTO: 0 /100 WBC
PLATELET # BLD AUTO: 207 K/UL (ref 164–446)
PMV BLD AUTO: 10.4 FL (ref 9–12.9)
POTASSIUM SERPL-SCNC: 4 MMOL/L (ref 3.6–5.5)
PROT SERPL-MCNC: 5.5 G/DL (ref 6–8.2)
RBC # BLD AUTO: 3.54 M/UL (ref 4.2–5.4)
RSV RNA SPEC QL NAA+PROBE: NEGATIVE
SARS-COV-2 RNA RESP QL NAA+PROBE: NOTDETECTED
SODIUM SERPL-SCNC: 136 MMOL/L (ref 135–145)
SPECIMEN SOURCE: NORMAL
WBC # BLD AUTO: 7.3 K/UL (ref 4.8–10.8)

## 2023-05-20 PROCEDURE — 700102 HCHG RX REV CODE 250 W/ 637 OVERRIDE(OP): Performed by: STUDENT IN AN ORGANIZED HEALTH CARE EDUCATION/TRAINING PROGRAM

## 2023-05-20 PROCEDURE — 80053 COMPREHEN METABOLIC PANEL: CPT

## 2023-05-20 PROCEDURE — 700105 HCHG RX REV CODE 258

## 2023-05-20 PROCEDURE — 99239 HOSP IP/OBS DSCHRG MGMT >30: CPT | Performed by: HOSPITALIST

## 2023-05-20 PROCEDURE — 85025 COMPLETE CBC W/AUTO DIFF WBC: CPT

## 2023-05-20 PROCEDURE — 700111 HCHG RX REV CODE 636 W/ 250 OVERRIDE (IP)

## 2023-05-20 PROCEDURE — 96375 TX/PRO/DX INJ NEW DRUG ADDON: CPT

## 2023-05-20 PROCEDURE — A9270 NON-COVERED ITEM OR SERVICE: HCPCS | Performed by: STUDENT IN AN ORGANIZED HEALTH CARE EDUCATION/TRAINING PROGRAM

## 2023-05-20 PROCEDURE — G0378 HOSPITAL OBSERVATION PER HR: HCPCS

## 2023-05-20 PROCEDURE — 83605 ASSAY OF LACTIC ACID: CPT | Mod: 91

## 2023-05-20 PROCEDURE — 700101 HCHG RX REV CODE 250

## 2023-05-20 RX ORDER — SULFAMETHOXAZOLE AND TRIMETHOPRIM 800; 160 MG/1; MG/1
1 TABLET ORAL 2 TIMES DAILY
Qty: 6 TABLET | Refills: 0 | Status: SHIPPED | OUTPATIENT
Start: 2023-05-20 | End: 2023-05-23

## 2023-05-20 RX ORDER — OMEPRAZOLE 40 MG/1
40 CAPSULE, DELAYED RELEASE ORAL DAILY
Qty: 30 CAPSULE | Refills: 0 | Status: SHIPPED | OUTPATIENT
Start: 2023-05-21

## 2023-05-20 RX ORDER — SODIUM CHLORIDE 9 MG/ML
INJECTION, SOLUTION INTRAVENOUS CONTINUOUS
Status: DISCONTINUED | OUTPATIENT
Start: 2023-05-20 | End: 2023-05-20

## 2023-05-20 RX ADMIN — CEFTRIAXONE SODIUM 2000 MG: 10 INJECTION, POWDER, FOR SOLUTION INTRAVENOUS at 05:38

## 2023-05-20 RX ADMIN — OMEPRAZOLE 40 MG: 20 CAPSULE, DELAYED RELEASE ORAL at 05:40

## 2023-05-20 RX ADMIN — ACETAMINOPHEN 650 MG: 325 TABLET, FILM COATED ORAL at 05:40

## 2023-05-20 RX ADMIN — SODIUM CHLORIDE: 9 INJECTION, SOLUTION INTRAVENOUS at 01:09

## 2023-05-20 RX ADMIN — SENNOSIDES AND DOCUSATE SODIUM 2 TABLET: 50; 8.6 TABLET ORAL at 05:40

## 2023-05-20 ASSESSMENT — PAIN DESCRIPTION - PAIN TYPE: TYPE: ACUTE PAIN

## 2023-05-20 NOTE — PROGRESS NOTES
Patient discharged to home with relatives.  PIV x1 removed, no home meds to send with patient.  Patient has paper prescriptions in hand, will take to pharmacy of choice.  Discharge instructions reviewed with patient and her nephew, both voiced understanding.  All belongings sent with patient.

## 2023-05-20 NOTE — DISCHARGE INSTRUCTIONS
See PCP next week to follow on this hospitalization.    Advance diet slowly at home and avoid reintroducing spicy foods for the next week. Discharge Instructions    Discharged to home by car with relative. Discharged via walking, hospital escort: Refused.  Special equipment needed: Not Applicable    Be sure to schedule a follow-up appointment with your primary care doctor or any specialists as instructed.     Discharge Plan:   Diet Plan: Discussed  Activity Level: Discussed  Confirmed Follow up Appointment: Patient to Call and Schedule Appointment  Confirmed Symptoms Management: Discussed  Medication Reconciliation Updated: Yes    I understand that a diet low in cholesterol, fat, and sodium is recommended for good health. Unless I have been given specific instructions below for another diet, I accept this instruction as my diet prescription.   Other diet: regular    Special Instructions: None    -Is this patient being discharged with medication to prevent blood clots?  No    Is patient discharged on Warfarin / Coumadin?   No

## 2023-05-20 NOTE — DISCHARGE SUMMARY
"Discharge Summary    CHIEF COMPLAINT ON ADMISSION  Chief Complaint   Patient presents with    Abdominal Pain     Patient reports to generalized abdominal pain that started at 1800 this evening. Patient reports the pain is located more severely above her umbilicus but is generalized through out.     N/V     Started when the pain began at 1800 this evening.        Reason for Admission  Abd pain     Admission Date  5/18/2023    CODE STATUS  Full Code    HPI & HOSPITAL COURSE    Professional  services were used in the patient's primary language of Beninese.    Umu Harris is a 72 y.o. female with history of cholelithiasis and GERD on no home medications who presented to ED on 5/18 with abdominal pain.  After eating spicy food for dinner she developed a midline to LUQ pain around 6pm in the evening. She describes the pain as constant with intermittent increases, burning, and cramping. She has not eaten since the pain began and noted no exacerbating or alleviating factors. She did initially have some associated nausea and vomiting. Having no change in BM pattern. No fever, chills, sick contacts, or recent travel. No dysuria. No prior abdominal surgeries. She states she has had \"gallbladder pain\" in the past which was quite unlike her pain now.     In ED, she was afebrile, HR 75, /101, on room air. Labs on presentation notable for WBC 8.0 and PMN 74.8%, Na 133, glucose 116, and lactic acid 2.6. AST, ALT, ALP, Tbili, and lipase all WNL. Troponin 11. EKG sinus without ischemic changes. UA unremarkable. CT of A/P with contrast reported cholelithiasis, hepatomegaly, small fat containing umbilical hernia, and coronary artery disease, otherwise no acute findings - no secondary signs of acute cholecystitis. No focal RUQ tenderness on exam. She was treated with GI cocktail and had intermittent relief, but pain did eventually return. IVF were given and lactic acid did trend down but remained persistently " elevated. Patient was admitted to hospitalist service for observation, continued fluids, and serial lactates.     She was hydrated with IVF overnight. Due to symptoms of dysuria, her UA was repeated and revealed a urinary infection. Ceftriaxone was started after which her hyperlactatemia resolved. She remained afebrile. By 5/20 AM she was able to tolerate a diet without nausea / pain. She feels ready to go home at this time. Prescriptions were provided for 3 days Bactrim DS BID as well as continuation of oral omeprazole at home. Provided instructions to follow with her PCP and sent referral for a [facility] PCP in case she wishes to establish with one.     Therefore, she is discharged in good and stable condition to home with close outpatient follow-up.    The patient recovered much more quickly than anticipated on admission.    Discharge Date  5/20    FOLLOW UP ITEMS POST DISCHARGE      DISCHARGE DIAGNOSES  Principal Problem (Resolved):    Abdominal pain (POA: Yes)  Active Problems:    Cholelithiasis (POA: Yes)    Acute cystitis without hematuria (POA: Yes)    Metabolic acidosis (POA: Yes)  Resolved Problems:    Hyperlactatemia (POA: Yes)      FOLLOW UP  No future appointments.  PCP  [phone]  Call today  Call your primary care provider during next office hours for a followup appointment. If you do not have a regular doctor, you can call above number to establish with a [facility] primary care doctor.      MEDICATIONS ON DISCHARGE     Medication List        START taking these medications        Instructions   omeprazole 40 MG delayed-release capsule  Start taking on: May 21, 2023  Commonly known as: PRILOSEC   Take 1 Capsule by mouth every day.  Dose: 40 mg     sulfamethoxazole-trimethoprim 800-160 MG tablet  Commonly known as: BACTRIM DS   Take 1 Tablet by mouth 2 times a day for 3 days.  Dose: 1 Tablet            CONTINUE taking these medications        Instructions   losartan 50 MG Tabs  Commonly known as: COZAAR    Take 50 mg by mouth every day.  Dose: 50 mg     VITAMIN C PO   Take 1 Tablet by mouth every day.  Dose: 1 Tablet     VITAMIN D PO   Take 1 Tablet by mouth every day.  Dose: 1 Tablet              Allergies  No Known Allergies    DIET  Orders Placed This Encounter   Procedures    Diet Order Diet: Full Liquid     Standing Status:   Standing     Number of Occurrences:   1     Order Specific Question:   Diet:     Answer:   Full Liquid [11]       ACTIVITY  As tolerated.  Weight bearing as tolerated    CONSULTATIONS      PROCEDURES      LABORATORY  Lab Results   Component Value Date    SODIUM 136 05/20/2023    POTASSIUM 4.0 05/20/2023    CHLORIDE 102 05/20/2023    CO2 22 05/20/2023    GLUCOSE 109 (H) 05/20/2023    BUN 15 05/20/2023    CREATININE 0.64 05/20/2023        Lab Results   Component Value Date    WBC 7.3 05/20/2023    HEMOGLOBIN 11.0 (L) 05/20/2023    HEMATOCRIT 32.0 (L) 05/20/2023    PLATELETCT 207 05/20/2023        Total time of the discharge process exceeds 38  minutes.

## 2023-05-20 NOTE — CARE PLAN
Problem: Pain - Standard  Goal: Alleviation of pain or a reduction in pain to the patient’s comfort goal  Outcome: Met     Problem: Knowledge Deficit - Standard  Goal: Patient and family/care givers will demonstrate understanding of plan of care, disease process/condition, diagnostic tests and medications  Outcome: Met   The patient is Stable - Low risk of patient condition declining or worsening    Shift Goals  Clinical Goals: DC home  Patient Goals: go home  Family Goals: HARSH    Progress made toward(s) clinical / shift goals:  yes, Pt left to DC lounge before 1000    Patient is not progressing towards the following goals:

## 2023-05-20 NOTE — CARE PLAN
The patient is Stable - Low risk of patient condition declining or worsening    Shift Goals  Clinical Goals: IVF/ pain control, nausea control, infection workup  Patient Goals: Pain control, rest  Family Goals: HARSH      Problem: Pain - Standard  Goal: Alleviation of pain or a reduction in pain to the patient’s comfort goal  Outcome: Progressing     Problem: Knowledge Deficit - Standard  Goal: Patient and family/care givers will demonstrate understanding of plan of care, disease process/condition, diagnostic tests and medications  Outcome: Progressing

## 2023-05-20 NOTE — PROGRESS NOTES
Report received from night shift RN. Assume care. Pt. AAOx4 pt is bed,  Assessment completed. VSS. Denies abd pain,  good CMS and pulses to demond LE, denies numbness and tingling.  states was able to tolerate  fluids last NOC. Pt was update for the care for the day. White board updated, All question answered. Pt has call light within reach,  bed is in the lowest position. Pt has no other needs at this time.    Pt is doing well tolerating Fluids diet well and No abd pain reported.